# Patient Record
Sex: MALE | Race: WHITE | NOT HISPANIC OR LATINO | Employment: FULL TIME | URBAN - METROPOLITAN AREA
[De-identification: names, ages, dates, MRNs, and addresses within clinical notes are randomized per-mention and may not be internally consistent; named-entity substitution may affect disease eponyms.]

---

## 2024-04-29 PROCEDURE — 99285 EMERGENCY DEPT VISIT HI MDM: CPT

## 2024-04-29 ASSESSMENT — PAIN - FUNCTIONAL ASSESSMENT: PAIN_FUNCTIONAL_ASSESSMENT: 0-10

## 2024-04-29 ASSESSMENT — COLUMBIA-SUICIDE SEVERITY RATING SCALE - C-SSRS
1. IN THE PAST MONTH, HAVE YOU WISHED YOU WERE DEAD OR WISHED YOU COULD GO TO SLEEP AND NOT WAKE UP?: NO
6. HAVE YOU EVER DONE ANYTHING, STARTED TO DO ANYTHING, OR PREPARED TO DO ANYTHING TO END YOUR LIFE?: NO
2. HAVE YOU ACTUALLY HAD ANY THOUGHTS OF KILLING YOURSELF?: NO

## 2024-04-29 ASSESSMENT — PAIN SCALES - GENERAL: PAINLEVEL_OUTOF10: 0 - NO PAIN

## 2024-04-30 ENCOUNTER — APPOINTMENT (OUTPATIENT)
Dept: RADIOLOGY | Facility: HOSPITAL | Age: 24
End: 2024-04-30

## 2024-04-30 ENCOUNTER — HOSPITAL ENCOUNTER (EMERGENCY)
Facility: HOSPITAL | Age: 24
Discharge: HOME | End: 2024-04-30
Attending: STUDENT IN AN ORGANIZED HEALTH CARE EDUCATION/TRAINING PROGRAM

## 2024-04-30 VITALS
HEIGHT: 69 IN | SYSTOLIC BLOOD PRESSURE: 90 MMHG | HEART RATE: 75 BPM | RESPIRATION RATE: 16 BRPM | BODY MASS INDEX: 20.73 KG/M2 | DIASTOLIC BLOOD PRESSURE: 41 MMHG | WEIGHT: 140 LBS | OXYGEN SATURATION: 95 % | TEMPERATURE: 98 F

## 2024-04-30 DIAGNOSIS — F10.920 ALCOHOLIC INTOXICATION WITHOUT COMPLICATION (CMS-HCC): ICD-10-CM
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
## 2024-04-30 PROCEDURE — 2500000002 HC RX 250 W HCPCS SELF ADMINISTERED DRUGS (ALT 637 FOR MEDICARE OP, ALT 636 FOR OP/ED): Performed by: STUDENT IN AN ORGANIZED HEALTH CARE EDUCATION/TRAINING PROGRAM

## 2024-04-30 PROCEDURE — S4991 NICOTINE PATCH NONLEGEND: HCPCS | Performed by: STUDENT IN AN ORGANIZED HEALTH CARE EDUCATION/TRAINING PROGRAM

## 2024-04-30 PROCEDURE — 72125 CT NECK SPINE W/O DYE: CPT | Performed by: RADIOLOGY

## 2024-04-30 PROCEDURE — 70450 CT HEAD/BRAIN W/O DYE: CPT | Performed by: RADIOLOGY

## 2024-04-30 PROCEDURE — 72125 CT NECK SPINE W/O DYE: CPT

## 2024-04-30 PROCEDURE — 71250 CT THORAX DX C-: CPT

## 2024-04-30 PROCEDURE — 70450 CT HEAD/BRAIN W/O DYE: CPT

## 2024-04-30 PROCEDURE — 71250 CT THORAX DX C-: CPT | Mod: FOREIGN READ | Performed by: RADIOLOGY

## 2024-04-30 RX ORDER — IBUPROFEN 200 MG
1 TABLET ORAL DAILY
Status: DISCONTINUED | OUTPATIENT
Start: 2024-04-30 | End: 2024-04-30 | Stop reason: HOSPADM

## 2024-04-30 RX ADMIN — NICOTINE 1 PATCH: 21 PATCH, EXTENDED RELEASE TRANSDERMAL at 02:20

## 2024-04-30 NOTE — ED PROVIDER NOTES
HPI   Chief Complaint   Patient presents with    Alcohol Intoxication    Fall     Mother reports brother found him in the garage passed out cold, there is dried blood to the right temporal region.        This is a 23-year-old male with no reported past medical history presenting to the emergency department after falling while intoxicated.  He is accompanied by his mom who assist in providing history.  Patient was 2 doors down from his mom's house at his aunts house earlier this evening.  He was drinking vodka and [place] Cunha and mom states that he drank more than normal.  Patient's brother was also at the aunts house with him.  The brother reportedly heard a thud from the garage and immediately ran to the garage to find the patient laying face first on the ground.  The brother tried to get him up and was having trouble doing so but ultimately was able to get the patient to his feet at which time he opened his eyes and started talking again.  Brother brought the patient home to his mom's house, and mom is bringing him to the emergency department for evaluation.  Patient himself has no complaints.  Does endorse drinking a large amount of alcohol tonight as well as smoking marijuana.      History provided by:  Patient and parent   used: No                          [place] Coma Scale Score: 14                  Patient History   Past Medical History:   Diagnosis Date    Asperger's syndrome ([facility])     Asperger syndrome     Past Surgical History:   Procedure Laterality Date    KNEE ARTHROSCOPY W/ DEBRIDEMENT  03/06/2017    Knee Arthroscopy (Therapeutic)     No family history on file.  Social History     Tobacco Use    Smoking status: Not on file    Smokeless tobacco: Not on file   Substance Use Topics    Alcohol use: Not on file    Drug use: Not on file       Physical Exam     Physical Exam  Constitutional:       General: He is not in acute distress.     Appearance: He is well-developed. He is not  toxic-appearing.   HENT:      Head: Normocephalic and atraumatic.      Mouth/Throat:      Mouth: Mucous membranes are moist.   Eyes:      Conjunctiva/sclera: Conjunctivae normal.      Pupils: Pupils are equal, round, and reactive to light.   Cardiovascular:      Rate and Rhythm: Normal rate and regular rhythm.      Pulses: Normal pulses.      Heart sounds: Normal heart sounds.      Comments: Mild tenderness to palpation over the right lower rib cage, no step-offs or deformities no palpable crepitus  Pulmonary:      Effort: Pulmonary effort is normal.      Breath sounds: Normal breath sounds.   Abdominal:      General: There is no distension.      Palpations: Abdomen is soft.      Tenderness: There is no abdominal tenderness.   Musculoskeletal:         General: Normal range of motion.      Cervical back: Neck supple.   Skin:     General: Skin is warm and dry.   Neurological:      Mental Status: He is alert and oriented to person, place, and time.      Sensory: No sensory deficit.      Motor: Motor function is intact. No weakness.      Comments: Clinically intoxicated but oriented to self, place, year and answers all questions appropriately.  5 out of 5 strength in the arms and legs with no pronator drift.         Labs Reviewed - No data to display    ED Course & MDM   Diagnoses as of 05/02/24 1306   Fall, initial encounter   Alcoholic intoxication without complication ([facility])       Medical Decision Making  This is a 23-year-old male with no reported past medical history presenting to the emergency department with a fall while intoxicated after drinking a very large amount of alcohol earlier in the night.  Fall was unwitnessed but was heard, and patient's brother came and found him immediately after hearing the fall.  On my exam, patient is somewhat clinically intoxicated but is answering questions appropriately and oriented x 3, following commands with 5 out of 5 strength in all 4 extremities.  He has mild  tenderness to palpation over the right lateral lower rib cage with otherwise no stigmata of traumatic injury on physical exam.  Given patient's fall intoxicated as well as areas of pain, noncontrast CT of the head, cervical spine, and chest were obtained that demonstrate no traumatic injuries on formal radiology interpretation including no ICH, no fracture, no hemothorax or pneumothorax.  Patient was monitored in the emergency department and allowed time to achieve clinical sobriety.  He is able to ambulate without assistance back and forth to the bathroom.  Mom is present at bedside, mom and patient both feel comfortable with patient going home with her at this time.  They were given return precautions and will follow-up on an outpatient basis.  All questions were answered.  Discharged in stable condition.          Procedure  Procedures    Lorenza Grigsby DO  Emergency Medicine     Lorenza Grigsby DO  05/02/24 9830